# Patient Record
Sex: FEMALE | Race: WHITE | NOT HISPANIC OR LATINO | Employment: FULL TIME | URBAN - METROPOLITAN AREA
[De-identification: names, ages, dates, MRNs, and addresses within clinical notes are randomized per-mention and may not be internally consistent; named-entity substitution may affect disease eponyms.]

---

## 2023-04-25 DIAGNOSIS — I10 PRIMARY HYPERTENSION: Primary | ICD-10-CM

## 2023-04-25 RX ORDER — LOSARTAN POTASSIUM 50 MG/1
50 TABLET ORAL DAILY
COMMUNITY
Start: 2022-10-20 | End: 2023-04-25 | Stop reason: SDUPTHER

## 2023-04-25 RX ORDER — LOSARTAN POTASSIUM 50 MG/1
50 TABLET ORAL DAILY
Qty: 90 TABLET | Refills: 3 | Status: SHIPPED | OUTPATIENT
Start: 2023-04-25 | End: 2023-09-07 | Stop reason: SDUPTHER

## 2023-07-05 ENCOUNTER — HOSPITAL ENCOUNTER (OUTPATIENT)
Dept: DATA CONVERSION | Facility: HOSPITAL | Age: 63
End: 2023-07-05
Attending: STUDENT IN AN ORGANIZED HEALTH CARE EDUCATION/TRAINING PROGRAM | Admitting: STUDENT IN AN ORGANIZED HEALTH CARE EDUCATION/TRAINING PROGRAM
Payer: COMMERCIAL

## 2023-07-05 DIAGNOSIS — C51.9 MALIGNANT NEOPLASM OF VULVA, UNSPECIFIED (MULTI): ICD-10-CM

## 2023-07-05 DIAGNOSIS — K21.9 GASTRO-ESOPHAGEAL REFLUX DISEASE WITHOUT ESOPHAGITIS: ICD-10-CM

## 2023-07-05 DIAGNOSIS — I10 ESSENTIAL (PRIMARY) HYPERTENSION: ICD-10-CM

## 2023-07-05 DIAGNOSIS — E66.01 MORBID (SEVERE) OBESITY DUE TO EXCESS CALORIES (MULTI): ICD-10-CM

## 2023-07-24 LAB
COMPLETE PATHOLOGY REPORT: NORMAL
CONVERTED CLINICAL DIAGNOSIS-HISTORY: NORMAL
CONVERTED FINAL DIAGNOSIS: NORMAL
CONVERTED FINAL REPORT PDF LINK TO COPY AND PASTE: NORMAL
CONVERTED GROSS DESCRIPTION: NORMAL

## 2023-09-07 ENCOUNTER — OFFICE VISIT (OUTPATIENT)
Dept: PRIMARY CARE | Facility: CLINIC | Age: 63
End: 2023-09-07
Payer: COMMERCIAL

## 2023-09-07 VITALS
OXYGEN SATURATION: 99 % | SYSTOLIC BLOOD PRESSURE: 130 MMHG | HEIGHT: 66 IN | HEART RATE: 80 BPM | DIASTOLIC BLOOD PRESSURE: 92 MMHG | BODY MASS INDEX: 35.65 KG/M2 | WEIGHT: 221.8 LBS

## 2023-09-07 DIAGNOSIS — I10 PRIMARY HYPERTENSION: ICD-10-CM

## 2023-09-07 DIAGNOSIS — Z13.0 SCREENING FOR DEFICIENCY ANEMIA: ICD-10-CM

## 2023-09-07 DIAGNOSIS — Z13.29 SCREENING FOR THYROID DISORDER: ICD-10-CM

## 2023-09-07 DIAGNOSIS — Z13.6 ENCOUNTER FOR SCREENING FOR CARDIOVASCULAR DISORDERS: ICD-10-CM

## 2023-09-07 DIAGNOSIS — Z00.00 WELLNESS EXAMINATION: Primary | ICD-10-CM

## 2023-09-07 DIAGNOSIS — K21.9 CHRONIC GERD: ICD-10-CM

## 2023-09-07 PROBLEM — J18.9 COMMUNITY ACQUIRED PNEUMONIA: Status: ACTIVE | Noted: 2017-01-01

## 2023-09-07 PROBLEM — K76.9 LIVER LESION, RIGHT LOBE: Status: ACTIVE | Noted: 2023-09-07

## 2023-09-07 PROBLEM — R06.02 EXERTIONAL SHORTNESS OF BREATH: Status: ACTIVE | Noted: 2023-09-07

## 2023-09-07 PROBLEM — K76.9 LIVER LESION: Status: ACTIVE | Noted: 2017-01-01

## 2023-09-07 PROBLEM — R74.8 ELEVATED LIVER ENZYMES: Status: ACTIVE | Noted: 2017-01-01

## 2023-09-07 PROBLEM — N76.2 ACUTE VULVITIS: Status: ACTIVE | Noted: 2023-09-07

## 2023-09-07 PROBLEM — R91.8 MASS OF UPPER LOBE OF RIGHT LUNG: Status: ACTIVE | Noted: 2023-09-07

## 2023-09-07 PROBLEM — E55.9 VITAMIN D DEFICIENCY: Status: ACTIVE | Noted: 2023-09-07

## 2023-09-07 PROBLEM — R74.8 INCREASED LIVER ENZYMES: Status: ACTIVE | Noted: 2023-09-07

## 2023-09-07 PROBLEM — E53.8 VITAMIN B 12 DEFICIENCY: Status: ACTIVE | Noted: 2023-09-07

## 2023-09-07 PROCEDURE — 99396 PREV VISIT EST AGE 40-64: CPT | Performed by: STUDENT IN AN ORGANIZED HEALTH CARE EDUCATION/TRAINING PROGRAM

## 2023-09-07 PROCEDURE — 1036F TOBACCO NON-USER: CPT | Performed by: STUDENT IN AN ORGANIZED HEALTH CARE EDUCATION/TRAINING PROGRAM

## 2023-09-07 PROCEDURE — 3080F DIAST BP >= 90 MM HG: CPT | Performed by: STUDENT IN AN ORGANIZED HEALTH CARE EDUCATION/TRAINING PROGRAM

## 2023-09-07 PROCEDURE — 3008F BODY MASS INDEX DOCD: CPT | Performed by: STUDENT IN AN ORGANIZED HEALTH CARE EDUCATION/TRAINING PROGRAM

## 2023-09-07 PROCEDURE — 3075F SYST BP GE 130 - 139MM HG: CPT | Performed by: STUDENT IN AN ORGANIZED HEALTH CARE EDUCATION/TRAINING PROGRAM

## 2023-09-07 RX ORDER — LOSARTAN POTASSIUM 50 MG/1
50 TABLET ORAL DAILY
Qty: 90 TABLET | Refills: 3 | Status: SHIPPED | OUTPATIENT
Start: 2023-09-07 | End: 2024-09-06

## 2023-09-07 RX ORDER — ACETAMINOPHEN 500 MG
1000 TABLET ORAL EVERY 6 HOURS
COMMUNITY
Start: 2023-07-05 | End: 2023-09-07 | Stop reason: ALTCHOICE

## 2023-09-07 RX ORDER — OMEPRAZOLE 40 MG/1
40 CAPSULE, DELAYED RELEASE ORAL DAILY
COMMUNITY
End: 2023-09-07 | Stop reason: SDUPTHER

## 2023-09-07 RX ORDER — DOCUSATE SODIUM 100 MG/1
100 CAPSULE, LIQUID FILLED ORAL 2 TIMES DAILY
COMMUNITY
Start: 2023-07-05 | End: 2023-09-07 | Stop reason: ALTCHOICE

## 2023-09-07 RX ORDER — CLOBETASOL PROPIONATE 0.5 MG/G
1 OINTMENT TOPICAL 2 TIMES DAILY
COMMUNITY
Start: 2023-09-05

## 2023-09-07 RX ORDER — TRAMADOL HYDROCHLORIDE 50 MG/1
50 TABLET ORAL EVERY 6 HOURS
COMMUNITY
Start: 2023-07-05 | End: 2023-09-07 | Stop reason: ALTCHOICE

## 2023-09-07 RX ORDER — IBUPROFEN 600 MG/1
600 TABLET ORAL EVERY 6 HOURS PRN
COMMUNITY
Start: 2023-07-05 | End: 2023-09-07 | Stop reason: ALTCHOICE

## 2023-09-07 RX ORDER — OMEPRAZOLE 40 MG/1
40 CAPSULE, DELAYED RELEASE ORAL DAILY
Qty: 90 CAPSULE | Refills: 3 | Status: SHIPPED | OUTPATIENT
Start: 2023-09-07 | End: 2024-09-06

## 2023-09-07 RX ORDER — BACITRACIN 500 [USP'U]/G
1 OINTMENT TOPICAL DAILY
COMMUNITY
Start: 2023-07-05 | End: 2023-09-07 | Stop reason: ALTCHOICE

## 2023-09-07 ASSESSMENT — ENCOUNTER SYMPTOMS: DEPRESSION: 0

## 2023-09-07 ASSESSMENT — PAIN SCALES - GENERAL: PAINLEVEL: 0-NO PAIN

## 2023-09-07 NOTE — PROGRESS NOTES
"Subjective   Patient ID: Yumiko Almodovar is a 63 y.o. female who presents for Annual Exam (She is not fasting.) and Med Refill.    HPI comes in for wellness visit    Review of Systems  Constitutional: NO F, chills, or sweats  Eyes: no blurred vision or visual disturbance  ENT: no hearing loss, no congestion, no nasal discharge, no hoarseness and no sore throat.   Cardiovascular: no chest pain, no edema, no palps and no syncope.   Respiratory: no cough,no s.o.b. and no wheezing  Gastrointestinal: no abdominal pain, No C/D no N/V, no blood in stools  Genitourinary: no dysuria, no change in urinary frequency, no urinary hesitancy and no feelings of urinary urgency.   Musculoskeletal: no arthralgias,  no back pain and no myalgias.   Integumentary: no new skin lesions and no rashes.   Neurological: no difficulty walking, no headache, no limb weakness, no numbness and no tingling.   Psychiatric: no anxiety, no depression, no anhedonia and no substance use disorders.   Endocrine: no recent weight gain and no recent weight loss.   Hematologic/Lymphatic: no tendency for easy bruising and no swollen glands.  Objective   BP (!) 130/92 (BP Location: Left arm, Patient Position: Sitting, BP Cuff Size: Large adult)   Pulse 80   Ht 1.676 m (5' 5.98\")   Wt 101 kg (221 lb 12.8 oz)   SpO2 99%   BMI 35.82 kg/m²     Physical Exam  gen- a & o x 3, nad, pleasant  heent- eomi, perrla, ear canals patent, TM's non-erythematous, no fluid, frontal and maxillary sinus's nontender  neck- supple, nontender, no palpable or enlarged nodes, no thyromegaly  heart- rrr, no murmurs  lungs- cta b/l , no w/r/r  chest- symmetric, nontender  ab- soft, nontender, no palpable organomegaly, postive bowel sounds  ex's- no c/c/e  neuro- CNs 2-12 grossly intact, full sensation and strength in all extremities    Assessment/Plan     1.  Wellness visit.  No concerns on exam.  Screening labs ordered today please be fasting.  She does see GYN she is up-to-date with " mammograms Pap smears.  She will check her records she may be due for colonoscopy in the next 1 to 2 years.    2.  Hypertension.  Continue current med.  Please reduce the amount of sodium in your diet (avoid canned soups, pretzels, nuts, popcorn, ketchup/soy sause/etc., and other high-sodium foods)  Get 30 minutes of aerobic exercise most days of the week (such as walking, swimming, riding a bike, etc.)  Work on reaching an ideal body weight which will improve the progression or even eliminate your hypertension.  Be aware of signs of stroke (which is increased in patients with extremely high blood pressure) such as facial droop, weakness on one side of the body, or slurred speech.   Smoking, caffeine, alcohol, stress and some medications may make your blood pressure worse. Please try to eliminate these.    #3.  Acid reflux well controlled on current med refill today.

## 2023-09-07 NOTE — PATIENT INSTRUCTIONS
1.  Wellness visit.  No concerns on exam.  Screening labs ordered today please be fasting.  She does see GYN she is up-to-date with mammograms Pap smears.  She will check her records she may be due for colonoscopy in the next 1 to 2 years.    2.  Hypertension.  Continue current med.  Please reduce the amount of sodium in your diet (avoid canned soups, pretzels, nuts, popcorn, ketchup/soy sause/etc., and other high-sodium foods)  Get 30 minutes of aerobic exercise most days of the week (such as walking, swimming, riding a bike, etc.)  Work on reaching an ideal body weight which will improve the progression or even eliminate your hypertension.  Be aware of signs of stroke (which is increased in patients with extremely high blood pressure) such as facial droop, weakness on one side of the body, or slurred speech.   Smoking, caffeine, alcohol, stress and some medications may make your blood pressure worse. Please try to eliminate these.    #3.  Acid reflux well controlled on current med refill today.

## 2023-09-09 ENCOUNTER — LAB (OUTPATIENT)
Dept: LAB | Facility: LAB | Age: 63
End: 2023-09-09
Payer: COMMERCIAL

## 2023-09-09 DIAGNOSIS — K21.9 CHRONIC GERD: ICD-10-CM

## 2023-09-09 DIAGNOSIS — Z13.6 ENCOUNTER FOR SCREENING FOR CARDIOVASCULAR DISORDERS: ICD-10-CM

## 2023-09-09 DIAGNOSIS — Z13.0 SCREENING FOR DEFICIENCY ANEMIA: ICD-10-CM

## 2023-09-09 DIAGNOSIS — Z00.00 WELLNESS EXAMINATION: ICD-10-CM

## 2023-09-09 DIAGNOSIS — Z13.29 SCREENING FOR THYROID DISORDER: ICD-10-CM

## 2023-09-09 DIAGNOSIS — I10 PRIMARY HYPERTENSION: ICD-10-CM

## 2023-09-09 LAB
ALANINE AMINOTRANSFERASE (SGPT) (U/L) IN SER/PLAS: 34 U/L (ref 7–45)
ALBUMIN (G/DL) IN SER/PLAS: 4.5 G/DL (ref 3.4–5)
ALKALINE PHOSPHATASE (U/L) IN SER/PLAS: 80 U/L (ref 33–136)
ANION GAP IN SER/PLAS: 14 MMOL/L (ref 10–20)
APPEARANCE, URINE: CLEAR
ASPARTATE AMINOTRANSFERASE (SGOT) (U/L) IN SER/PLAS: 25 U/L (ref 9–39)
BASOPHILS (10*3/UL) IN BLOOD BY AUTOMATED COUNT: 0.04 X10E9/L (ref 0–0.1)
BASOPHILS/100 LEUKOCYTES IN BLOOD BY AUTOMATED COUNT: 0.5 % (ref 0–2)
BILIRUBIN TOTAL (MG/DL) IN SER/PLAS: 0.9 MG/DL (ref 0–1.2)
BILIRUBIN, URINE: NEGATIVE
BLOOD, URINE: NEGATIVE
CALCIDIOL (25 OH VITAMIN D3) (NG/ML) IN SER/PLAS: 26 NG/ML
CALCIUM (MG/DL) IN SER/PLAS: 9.8 MG/DL (ref 8.6–10.6)
CARBON DIOXIDE, TOTAL (MMOL/L) IN SER/PLAS: 26 MMOL/L (ref 21–32)
CHLORIDE (MMOL/L) IN SER/PLAS: 102 MMOL/L (ref 98–107)
CHOLESTEROL (MG/DL) IN SER/PLAS: 218 MG/DL (ref 0–199)
CHOLESTEROL IN HDL (MG/DL) IN SER/PLAS: 57.6 MG/DL
CHOLESTEROL/HDL RATIO: 3.8
COBALAMIN (VITAMIN B12) (PG/ML) IN SER/PLAS: 407 PG/ML (ref 211–911)
COLOR, URINE: NORMAL
CREATININE (MG/DL) IN SER/PLAS: 0.69 MG/DL (ref 0.5–1.05)
EOSINOPHILS (10*3/UL) IN BLOOD BY AUTOMATED COUNT: 0.6 X10E9/L (ref 0–0.7)
EOSINOPHILS/100 LEUKOCYTES IN BLOOD BY AUTOMATED COUNT: 7.4 % (ref 0–6)
ERYTHROCYTE DISTRIBUTION WIDTH (RATIO) BY AUTOMATED COUNT: 12.5 % (ref 11.5–14.5)
ERYTHROCYTE MEAN CORPUSCULAR HEMOGLOBIN CONCENTRATION (G/DL) BY AUTOMATED: 34.4 G/DL (ref 32–36)
ERYTHROCYTE MEAN CORPUSCULAR VOLUME (FL) BY AUTOMATED COUNT: 92 FL (ref 80–100)
ERYTHROCYTES (10*6/UL) IN BLOOD BY AUTOMATED COUNT: 4.59 X10E12/L (ref 4–5.2)
GFR FEMALE: >90 ML/MIN/1.73M2
GLUCOSE (MG/DL) IN SER/PLAS: 98 MG/DL (ref 74–99)
GLUCOSE, URINE: NEGATIVE MG/DL
HEMATOCRIT (%) IN BLOOD BY AUTOMATED COUNT: 42.2 % (ref 36–46)
HEMOGLOBIN (G/DL) IN BLOOD: 14.5 G/DL (ref 12–16)
IMMATURE GRANULOCYTES/100 LEUKOCYTES IN BLOOD BY AUTOMATED COUNT: 0.6 % (ref 0–0.9)
KETONES, URINE: NEGATIVE MG/DL
LDL: 131 MG/DL (ref 0–99)
LEUKOCYTE ESTERASE, URINE: NEGATIVE
LEUKOCYTES (10*3/UL) IN BLOOD BY AUTOMATED COUNT: 8.2 X10E9/L (ref 4.4–11.3)
LYMPHOCYTES (10*3/UL) IN BLOOD BY AUTOMATED COUNT: 1.63 X10E9/L (ref 1.2–4.8)
LYMPHOCYTES/100 LEUKOCYTES IN BLOOD BY AUTOMATED COUNT: 20 % (ref 13–44)
MONOCYTES (10*3/UL) IN BLOOD BY AUTOMATED COUNT: 0.55 X10E9/L (ref 0.1–1)
MONOCYTES/100 LEUKOCYTES IN BLOOD BY AUTOMATED COUNT: 6.7 % (ref 2–10)
NEUTROPHILS (10*3/UL) IN BLOOD BY AUTOMATED COUNT: 5.29 X10E9/L (ref 1.2–7.7)
NEUTROPHILS/100 LEUKOCYTES IN BLOOD BY AUTOMATED COUNT: 64.8 % (ref 40–80)
NITRITE, URINE: NEGATIVE
NRBC (PER 100 WBCS) BY AUTOMATED COUNT: 0 /100 WBC (ref 0–0)
PH, URINE: 6 (ref 5–8)
PLATELETS (10*3/UL) IN BLOOD AUTOMATED COUNT: 271 X10E9/L (ref 150–450)
POTASSIUM (MMOL/L) IN SER/PLAS: 4.1 MMOL/L (ref 3.5–5.3)
PROTEIN TOTAL: 7.2 G/DL (ref 6.4–8.2)
PROTEIN, URINE: NEGATIVE MG/DL
SODIUM (MMOL/L) IN SER/PLAS: 138 MMOL/L (ref 136–145)
SPECIFIC GRAVITY, URINE: 1.01 (ref 1–1.03)
THYROTROPIN (MIU/L) IN SER/PLAS BY DETECTION LIMIT <= 0.05 MIU/L: 1.64 MIU/L (ref 0.44–3.98)
TRIGLYCERIDE (MG/DL) IN SER/PLAS: 149 MG/DL (ref 0–149)
UREA NITROGEN (MG/DL) IN SER/PLAS: 15 MG/DL (ref 6–23)
UROBILINOGEN, URINE: <2 MG/DL (ref 0–1.9)
VLDL: 30 MG/DL (ref 0–40)

## 2023-09-09 PROCEDURE — 36415 COLL VENOUS BLD VENIPUNCTURE: CPT

## 2023-09-09 PROCEDURE — 81003 URINALYSIS AUTO W/O SCOPE: CPT

## 2023-09-09 PROCEDURE — 82306 VITAMIN D 25 HYDROXY: CPT

## 2023-09-09 PROCEDURE — 84443 ASSAY THYROID STIM HORMONE: CPT

## 2023-09-09 PROCEDURE — 85025 COMPLETE CBC W/AUTO DIFF WBC: CPT

## 2023-09-09 PROCEDURE — 80053 COMPREHEN METABOLIC PANEL: CPT

## 2023-09-09 PROCEDURE — 82607 VITAMIN B-12: CPT

## 2023-09-09 PROCEDURE — 80061 LIPID PANEL: CPT

## 2023-09-30 NOTE — H&P
History of Present Illness:   History Present Illness:  Reason for surgery: Basal cell carcinoma of the vulva   HPI:    HPI: 64yo  presenting in GYN Oncology consultation regarding newly diagnosed basal cell carcinoma of the left vulva. Reports enlarging cyst of L labium that  spontaneously ruptured; presented to Dr White for evaluation and treated empirically with antibiotics. Mass persisted with subsequent biopsy demonstrating basal cell carcinoma. Presenting for wide local excision of the vulva     Pathology:  23 - FINAL DIAGNOSIS  A. VULVA, BIOPSY:  -- BASAL CELL CARCINOMA, NODULAR TYPE, BRISKLY INFLAMED, PRESENT AT TISSUE EDGES     ============================================  Medical History:  - BMI 35  - Hypertension   - GERD    Surgical History:  - Abdominal hysterectomy: AUB-L  - Cholecystectomy     Obstetric/Gynecologic History: , FTSVD x3  - Menarche age 11  - History of OCP or HRT use: OCPs x1 year, denies HRT   - Last Pap: uncertain; denies prior abnormal     Family History:   - Sister  2/2 unspecified GYN cancer (uncertain type); no other family history   - Denies family history of breast/colon/ovarian/uterine cancers    Social History:  ( - [place]); works as   - Tobacco: denies  - Alcohol: social use  - Illicit substances, narcotics: denies     Health Maintenance:   - Last mammogram: 2023  - Last colonoscopy/CRC screenin, q5 year follow up     Code Status: Full     Allergies:        Allergies:  ·  No Known Allergies :     Home Medication Review:   Home Medications Reviewed: yes     Impression/Procedure:   ·  Impression and Planned Procedure: Basal cell carcinoma of the vulva, presenting for wide local excision       ERAS (Enhanced Recovery After Surgery):  ·  ERAS Patient: no       Physical Exam by System:    Constitutional: Well developed, awake/alert/oriented  x3, no distress, alert and cooperative   ENMT: mucous membranes moist,  no apparent injury,  no lesions seen   Head/Neck: Neck supple, no apparent injury, thyroid  without mass or tenderness, No JVD, trachea midline, no bruits   Respiratory/Thorax: Patent airways, CTAB, normal  breath sounds with good chest expansion, thorax symmetric   Cardiovascular: Regular, rate and rhythm, no murmurs,  2+ equal pulses of the extremities, normal S 1and S 2   Gastrointestinal: Nondistended, soft, non-tender,  no rebound tenderness or guarding, no masses palpable, no organomegaly, +BS, no bruits   Psychological: Appropriate mood and behavior   Skin: Warm and dry, no lesions, no rashes     Consent:   COVID-19 Consent:  ·  COVID-19 Risk Consent Surgeon has reviewed key risks related to the risk of bryant COVID-19 and if they contract COVID-19 what the risks are.       Electronic Signatures:  Jodi Byrnes)  (Signed 05-Jul-2023 06:51)   Authored: History of Present Illness, Allergies, Home  Medication Review, Impression/Procedure, ERAS, Physical Exam, Consent, Note Completion      Last Updated: 05-Jul-2023 06:51 by Jodi Byrnes)

## 2023-10-02 NOTE — OP NOTE
Post Operative Note:     PreOp Diagnosis: Basal cell carcinoma of the vulva   Post-Procedure Diagnosis: Same   Procedure: 1. Wide local excision of the vulva  2.   3.   4.   5.   Surgeon: KALEN Byrnes   Resident/Fellow/Other Assistant: MERLIN Kimball   Anesthesia: MAC   Estimated Blood Loss (mL): 15   Specimen: yes. wide local excision of the left vulva  stitch at 12   Complications: None apparent   Findings: See below   Patient Returned To/Condition: PACU, good   Additional Details: 3cm ulcerated, rolling lesion  of the left vulva at 5 o'clock. No other lesions or masses are appreciated     Operative Report Dictated:  Dictation: not applicable - note contains Operative  Report   Operative Report:    Consent was completed in pre-op holding area after discussing all risks/benefits/alternatives.   Pt was taken to OR with IV in place. Team huddle and timeout were performed with all appropriate team members.  Anesthesia was induced without difficulty. Pt was placed in dorsal lithotomy position in richelle stirrups and prepped and draped in the usual  sterile fashion.       Lesion as discussed in findings was noted at the left labia majora.  The skin surrounding the lesion was injected with marcaine, 25cc total.  This was grasped with an allis clamp and incised with a scalpel.  The specimen was approximately 6cm long prior  to incision.  Bovie was used for hemostasis.  The incision was closed in 2 layers. The deep layer was closed with interrupted sutures using 2-0 vicryl.  Multiple interrupted sutures with 3-0 vicryl were used to re-approximate the skin. Bacitracin was  then applied to the incision.      Sponge/lap/needle/instrument counts were correct x 2.      Pt was returned to PACU in stable condition.  She will be discharged home from PACU.        Electronic Signatures:  Jodi Byrnes)  (Signed 05-Jul-2023 08:48)   Authored: Post Operative Note, Note Completion      Last Updated: 05-Jul-2023 08:48 by Fitz  Jodi THAO)

## 2024-09-10 ENCOUNTER — APPOINTMENT (OUTPATIENT)
Dept: PRIMARY CARE | Facility: CLINIC | Age: 64
End: 2024-09-10
Payer: COMMERCIAL

## 2024-09-23 ENCOUNTER — HOSPITAL ENCOUNTER (OUTPATIENT)
Dept: RADIOLOGY | Facility: HOSPITAL | Age: 64
Discharge: HOME | End: 2024-09-23
Payer: COMMERCIAL

## 2024-09-23 DIAGNOSIS — E78.00 PURE HYPERCHOLESTEROLEMIA, UNSPECIFIED: ICD-10-CM

## 2024-09-23 PROCEDURE — 75571 CT HRT W/O DYE W/CA TEST: CPT

## 2024-10-02 DIAGNOSIS — I10 PRIMARY HYPERTENSION: ICD-10-CM

## 2024-10-03 RX ORDER — LOSARTAN POTASSIUM 50 MG/1
50 TABLET ORAL DAILY
Qty: 90 TABLET | Refills: 3 | Status: SHIPPED | OUTPATIENT
Start: 2024-10-03 | End: 2025-10-03

## 2024-11-15 ENCOUNTER — LAB (OUTPATIENT)
Dept: LAB | Facility: LAB | Age: 64
End: 2024-11-15
Payer: COMMERCIAL

## 2024-11-15 DIAGNOSIS — R79.89 OTHER SPECIFIED ABNORMAL FINDINGS OF BLOOD CHEMISTRY: Primary | ICD-10-CM

## 2024-11-15 LAB
ALBUMIN SERPL BCP-MCNC: 4.4 G/DL (ref 3.4–5)
ALP SERPL-CCNC: 79 U/L (ref 33–136)
ALT SERPL W P-5'-P-CCNC: 61 U/L (ref 7–45)
ANION GAP SERPL CALC-SCNC: 11 MMOL/L (ref 10–20)
AST SERPL W P-5'-P-CCNC: 30 U/L (ref 9–39)
BILIRUB SERPL-MCNC: 1 MG/DL (ref 0–1.2)
BUN SERPL-MCNC: 12 MG/DL (ref 6–23)
CALCIUM SERPL-MCNC: 9.7 MG/DL (ref 8.6–10.6)
CHLORIDE SERPL-SCNC: 103 MMOL/L (ref 98–107)
CO2 SERPL-SCNC: 28 MMOL/L (ref 21–32)
CREAT SERPL-MCNC: 0.57 MG/DL (ref 0.5–1.05)
EGFRCR SERPLBLD CKD-EPI 2021: >90 ML/MIN/1.73M*2
GLUCOSE SERPL-MCNC: 103 MG/DL (ref 74–99)
POTASSIUM SERPL-SCNC: 3.9 MMOL/L (ref 3.5–5.3)
PROT SERPL-MCNC: 6.8 G/DL (ref 6.4–8.2)
SODIUM SERPL-SCNC: 138 MMOL/L (ref 136–145)

## 2024-11-15 PROCEDURE — 80053 COMPREHEN METABOLIC PANEL: CPT

## 2024-11-15 PROCEDURE — 36415 COLL VENOUS BLD VENIPUNCTURE: CPT
